# Patient Record
Sex: FEMALE | Race: WHITE | Employment: FULL TIME | URBAN - METROPOLITAN AREA
[De-identification: names, ages, dates, MRNs, and addresses within clinical notes are randomized per-mention and may not be internally consistent; named-entity substitution may affect disease eponyms.]

---

## 2021-03-09 PROBLEM — F17.210 CIGARETTE NICOTINE DEPENDENCE WITHOUT COMPLICATION: Status: ACTIVE | Noted: 2021-03-09

## 2023-11-04 ENCOUNTER — HOSPITAL ENCOUNTER (EMERGENCY)
Age: 53
Discharge: HOME | End: 2023-11-04
Payer: COMMERCIAL

## 2023-11-04 VITALS
HEART RATE: 91 BPM | TEMPERATURE: 98.2 F | OXYGEN SATURATION: 100 % | SYSTOLIC BLOOD PRESSURE: 162 MMHG | DIASTOLIC BLOOD PRESSURE: 92 MMHG | RESPIRATION RATE: 18 BRPM

## 2023-11-04 DIAGNOSIS — Z76.89 ENCOUNTER FOR MEDICATION ADMINISTRATION: Primary | ICD-10-CM

## 2023-11-04 DIAGNOSIS — F11.20 METHADONE DEPENDENCE (HCC): ICD-10-CM

## 2023-11-04 PROCEDURE — 6370000000 HC RX 637 (ALT 250 FOR IP): Performed by: PHYSICIAN ASSISTANT

## 2023-11-04 PROCEDURE — 99283 EMERGENCY DEPT VISIT LOW MDM: CPT

## 2023-11-04 RX ORDER — METHADONE HYDROCHLORIDE 10 MG/1
80 TABLET ORAL ONCE
Status: COMPLETED | OUTPATIENT
Start: 2023-11-04 | End: 2023-11-04

## 2023-11-04 RX ADMIN — METHADONE HYDROCHLORIDE 80 MG: 10 TABLET ORAL at 13:39

## 2023-11-04 NOTE — ED NOTES
Written and verbal discharge provided to patient, patient verbalizes understanding. Patient ambulatory with steady gait, GCS 15, no acute signs or symptoms of distress. Patient discharged at this time.      Divya Trujillo RN  11/04/23 1400

## 2023-11-04 NOTE — DISCHARGE INSTRUCTIONS
I do understand missing the clinic today and being out of methadone 80 mg daily. Methadone 80 mg given in ED. I did contact the methadone clinic but they were closed I left voicemail and I doubt I will get a response. You have choice to return to this facility tomorrow for dosing or hold off until Monday to get your usual dosing.

## 2023-11-05 ENCOUNTER — HOSPITAL ENCOUNTER (EMERGENCY)
Age: 53
Discharge: HOME | End: 2023-11-05
Payer: COMMERCIAL

## 2023-11-05 VITALS
TEMPERATURE: 98.3 F | HEIGHT: 63 IN | WEIGHT: 168 LBS | BODY MASS INDEX: 29.77 KG/M2 | RESPIRATION RATE: 14 BRPM | HEART RATE: 81 BPM | DIASTOLIC BLOOD PRESSURE: 85 MMHG | OXYGEN SATURATION: 98 % | SYSTOLIC BLOOD PRESSURE: 141 MMHG

## 2023-11-05 DIAGNOSIS — Z76.89 ENCOUNTER FOR MEDICATION ADMINISTRATION: Primary | ICD-10-CM

## 2023-11-05 PROCEDURE — 6370000000 HC RX 637 (ALT 250 FOR IP): Performed by: PHYSICIAN ASSISTANT

## 2023-11-05 PROCEDURE — 99283 EMERGENCY DEPT VISIT LOW MDM: CPT

## 2023-11-05 RX ORDER — METHADONE HYDROCHLORIDE 10 MG/1
80 TABLET ORAL ONCE
Status: COMPLETED | OUTPATIENT
Start: 2023-11-05 | End: 2023-11-05

## 2023-11-05 RX ADMIN — METHADONE HYDROCHLORIDE 80 MG: 10 TABLET ORAL at 12:38

## 2023-11-05 ASSESSMENT — PAIN - FUNCTIONAL ASSESSMENT: PAIN_FUNCTIONAL_ASSESSMENT: NONE - DENIES PAIN

## 2023-11-05 NOTE — DISCHARGE INSTRUCTIONS
Methadone 80 mg given today. Follow-up with your methadone clinic tomorrow. Recommend discuss progressive taper with the methadone clinic staff.

## 2025-07-12 ENCOUNTER — HOSPITAL ENCOUNTER (EMERGENCY)
Age: 55
Discharge: HOME | End: 2025-07-12
Attending: EMERGENCY MEDICINE
Payer: COMMERCIAL

## 2025-07-12 VITALS
SYSTOLIC BLOOD PRESSURE: 148 MMHG | OXYGEN SATURATION: 96 % | RESPIRATION RATE: 18 BRPM | TEMPERATURE: 97.7 F | DIASTOLIC BLOOD PRESSURE: 83 MMHG | WEIGHT: 166.2 LBS | BODY MASS INDEX: 29.44 KG/M2 | HEART RATE: 99 BPM

## 2025-07-12 DIAGNOSIS — S61.210A LACERATION OF RIGHT INDEX FINGER WITHOUT FOREIGN BODY WITHOUT DAMAGE TO NAIL, INITIAL ENCOUNTER: Primary | ICD-10-CM

## 2025-07-12 PROCEDURE — 90715 TDAP VACCINE 7 YRS/> IM: CPT | Performed by: EMERGENCY MEDICINE

## 2025-07-12 PROCEDURE — 99282 EMERGENCY DEPT VISIT SF MDM: CPT

## 2025-07-12 PROCEDURE — 90471 IMMUNIZATION ADMIN: CPT | Performed by: EMERGENCY MEDICINE

## 2025-07-12 PROCEDURE — 6360000002 HC RX W HCPCS: Performed by: EMERGENCY MEDICINE

## 2025-07-12 PROCEDURE — 12001 RPR S/N/AX/GEN/TRNK 2.5CM/<: CPT

## 2025-07-12 RX ADMIN — TETANUS TOXOID, REDUCED DIPHTHERIA TOXOID AND ACELLULAR PERTUSSIS VACCINE, ADSORBED 0.5 ML: 5; 2.5; 8; 8; 2.5 SUSPENSION INTRAMUSCULAR at 22:19

## 2025-07-13 NOTE — ED PROVIDER NOTES
EMERGENCY DEPARTMENT RESIDENT ENCOUNTER     [facility] EMERGENCY DEPARTMENT     Pt Name: Shannon Morgan   MRN: [id number]   Birthdate 1970   Date of evaluation: 7/12/2025   Provider: Mar Collazo MD   PCP: Jadiel Rizo MD   Note Started: 10:41 PM EDT 7/12/25     CHIEF COMPLAINT     Chief Complaint   Patient presents with    Laceration     Right pointer finger laceration from ceramic knife. UTD on tetanus.         HISTORY OF PRESENT ILLNESS:  History from : Patient   Limitations to history : None     Shannon Morgan is a 54 y.o. female who presents with a right index finger laceration.  She reports that she was doing dishes and reached into a soapy sink to grab the silverware and her index finger got sliced with a ceramic knife.  She states that she tried to get the bleeding to stop at home with direct pressure but it continued to bleed so she came to the ED.  She denies any weakness or tingling in the finger.  She states that she believes her last tetanus shot was last year but is not positive.    Nursing Notes were all reviewed and agreed with or any disagreements were addressed in the HPI.     ROS: Positives and Pertinent negatives as per HPI.    PAST MEDICAL HISTORY     PHYSICAL EXAM:  ED Triage Vitals   BP Systolic BP Percentile Diastolic BP Percentile Temp Temp Source Pulse Respirations SpO2   07/12/25 2144 -- -- 07/12/25 2143 07/12/25 2143 07/12/25 2143 07/12/25 2143 07/12/25 2143   (!) 148/83   97.7 °F (36.5 °C) Oral 99 18 96 %      Height Weight - Scale         -- 07/12/25 2143          75.4 kg (166 lb 3.2 oz)              Physical Exam  Constitutional:       General: She is not in acute distress.     Appearance: Normal appearance.   Musculoskeletal:      Right hand: Laceration present.      Left hand: Normal.   Skin:     General: Skin is warm.      Capillary Refill: Capillary refill takes less than 2 seconds.      Findings: Laceration present.             Comments: 1 cm laceration

## 2025-07-13 NOTE — ED PROVIDER NOTES
Emergency Department Attending Provider Note  Location: [facility] EMERGENCY DEPARTMENT  7/12/2025     Patient Identification  Shannon Morgan is a 54 y.o. female      HPI:Shannon Morgan was evaluated in the Emergency Department for finger laceration.. Although initial history and physical exam information was obtained by the resident physician (who also dictated a record of this visit), I personally saw the patient and performed a substantive portion of the visit including all aspects of the medical decision making.      PHYSICAL EXAM:  Physical Exam  Nontoxic-appearing adult female in no acute distress.  She has an approximately 1 cm laceration to her right index finger dorsally.  No active bleeding.    EKG Interpretation      Patient seen and evaluated.  Relevant records reviewed.    MDM:  Adult female who comes in with laceration to her right index finger.  No visible foreign body.  She has no injury to the nail.  Hemostasis controlled.  The wound was cleaned by nursing staff.  It was approximated by resident.  See their note for detail procedure.  Tetanus was updated.  Patient will be discharged outpatient follow-up with PCP as needed.        CLINICAL IMPRESSION  1. Laceration of right index finger without foreign body without damage to nail, initial encounter            I personally saw the patient and independently provided  minutes of non-concurrent critical care out of the total shared critical care time provided.    This chart was generated in part by using Dragon Dictation system and may contain errors related to that system including errors in grammar, punctuation, and spelling, as well as words and phrases that may be inappropriate. If there are any questions or concerns please feel free to contact the dictating provider for clarification.     Kristal Gregorio MD  I am the primary clinician of record.    [employer]       Kristal Gregorio MD  07/12/25 2700

## 2025-07-13 NOTE — ED NOTES
Ok for d/c. Stable and ambulatory. Edu pt and family bedside re:monitoring for S&S of infection and wound care at home. All questions answered. Pt left w/ family and all personal belongings